# Patient Record
Sex: FEMALE | Race: BLACK OR AFRICAN AMERICAN | NOT HISPANIC OR LATINO | Employment: OTHER | URBAN - METROPOLITAN AREA
[De-identification: names, ages, dates, MRNs, and addresses within clinical notes are randomized per-mention and may not be internally consistent; named-entity substitution may affect disease eponyms.]

---

## 2018-07-02 PROBLEM — R29.818 ACUTE FOCAL NEUROLOGICAL DEFICIT: Status: ACTIVE | Noted: 2018-07-02

## 2019-05-20 ENCOUNTER — TELEPHONE (OUTPATIENT)
Dept: RHEUMATOLOGY | Facility: CLINIC | Age: 49
End: 2019-05-20

## 2019-05-20 NOTE — TELEPHONE ENCOUNTER
Patient has been given phone number for Aneudy Jackson as Dr. Palacios has not available appointments to schedule

## 2019-05-20 NOTE — TELEPHONE ENCOUNTER
----- Message from Leanna Lauren sent at 5/20/2019 10:52 AM CDT -----  Contact: patient  Type: Needs Medical Advice    Who Called:  patient  Symptoms (please be specific):  Already had MRI doneand MS was ruled out. Left side of face still tingles off and on. And sometimes she loses her balance  How long has patient had these symptoms:  Almost 1 year  Best Call Back Number: [phone]  Additional Information: calling to schedule new patient appointment

## 2020-09-29 ENCOUNTER — TELEPHONE (OUTPATIENT)
Dept: PSYCHIATRY | Facility: CLINIC | Age: 50
End: 2020-09-29

## 2020-09-29 NOTE — TELEPHONE ENCOUNTER
Pt called clinic regarding below message. Advised pt of below per Dr. Smith. Pt will contact medical records. Pt would like to schedule with Donna MITCHELL. Appt date, time, and location given. Pt verbalized understanding with no further questions.

## 2020-09-29 NOTE — TELEPHONE ENCOUNTER
I recommend that the patient obtain prior records through medical records dept.  If she needs an appointment, then please offer an appt with Donna Solis PA-C.

## 2020-09-29 NOTE — TELEPHONE ENCOUNTER
Patient states that she has not seen Dr Smith in 6 years. Her life insurance company is asking that she get the doctor that she was seeing for her mental condition to fill out the paperwork.  She would like to know since Dr Smith the last provider she has seen for this condition will Dr Smith be able to fill out this insurance paperwork? Or should patient ask insurance company to send release to get the medical records from 6 years ago?  Please advise  Explained to patient will call her and let her know what can be done once message reviewed by Dr Smith.

## 2021-01-11 PROBLEM — Z91.89 AT HIGH RISK FOR BREAST CANCER: Status: ACTIVE | Noted: 2021-01-11

## 2021-01-11 PROBLEM — N63.25 MASS OVERLAPPING MULTIPLE QUADRANTS OF LEFT BREAST: Status: ACTIVE | Noted: 2021-01-11

## 2021-01-11 PROBLEM — N63.15 MASS OVERLAPPING MULTIPLE QUADRANTS OF RIGHT BREAST: Status: ACTIVE | Noted: 2021-01-11

## 2021-01-11 PROBLEM — Z12.31 VISIT FOR SCREENING MAMMOGRAM: Status: ACTIVE | Noted: 2021-01-11

## 2021-06-07 ENCOUNTER — TELEPHONE (OUTPATIENT)
Dept: PAIN MEDICINE | Facility: CLINIC | Age: 51
End: 2021-06-07

## 2023-08-01 PROBLEM — Z91.89 INCREASED RISK OF BREAST CANCER: Status: ACTIVE | Noted: 2023-08-01

## 2023-08-04 PROBLEM — Z15.01 GENETIC SUSCEPTIBILITY TO MALIGNANT NEOPLASM OF BREAST: Status: ACTIVE | Noted: 2023-08-04

## 2023-08-07 ENCOUNTER — DOCUMENT SCAN (OUTPATIENT)
Dept: HOME HEALTH SERVICES | Facility: HOSPITAL | Age: 53
End: 2023-08-07
Payer: MEDICARE

## 2023-08-08 ENCOUNTER — EXTERNAL HOME HEALTH (OUTPATIENT)
Dept: HOME HEALTH SERVICES | Facility: HOSPITAL | Age: 53
End: 2023-08-08
Payer: MEDICARE